# Patient Record
Sex: MALE | Race: AMERICAN INDIAN OR ALASKA NATIVE
[De-identification: names, ages, dates, MRNs, and addresses within clinical notes are randomized per-mention and may not be internally consistent; named-entity substitution may affect disease eponyms.]

---

## 2022-05-18 ENCOUNTER — HOSPITAL ENCOUNTER (EMERGENCY)
Dept: HOSPITAL 5 - ED | Age: 52
Discharge: HOME | End: 2022-05-18
Payer: SELF-PAY

## 2022-05-18 VITALS — DIASTOLIC BLOOD PRESSURE: 86 MMHG | SYSTOLIC BLOOD PRESSURE: 132 MMHG

## 2022-05-18 DIAGNOSIS — K05.00: ICD-10-CM

## 2022-05-18 DIAGNOSIS — K02.9: Primary | ICD-10-CM

## 2022-05-18 PROCEDURE — 96372 THER/PROPH/DIAG INJ SC/IM: CPT

## 2022-05-18 PROCEDURE — 99283 EMERGENCY DEPT VISIT LOW MDM: CPT

## 2022-05-18 NOTE — EMERGENCY DEPARTMENT REPORT
ED Headache HPI





- General


Chief Complaint: Headache


Stated Complaint: HEADACHE


Time Seen by Provider: 05/18/22 12:26





- History of Present Illness


Initial Comments: 





52 yo M who presents with left upper dental pain that started yesterday shortly 

after eating Mexican chili food.  This dental pain is radiating and causing him 

left-sided headache.  Eating or drinking worsening pain.  She has tried 

over-the-counter medication with no relief.  Patient denies any fever or chills.

 Patient has not seen any dentist recently.  No other modifying or positive 

factors reported.


Allergies/Adverse Reactions: 


Allergies





No Known Allergies Allergy (Verified 05/18/22 09:27)


   








Home Medications: 


Ambulatory Orders





Ketorolac [Toradol] 10 mg PO Q6H PRN 3 Days #12 tab NS 05/18/22 


Penicillin V Potassium 500 mg PO BID 10 Days #20 tab NS 05/18/22 











ED Review of Systems


ROS: 


Stated complaint: HEADACHE


Other details as noted in HPI





Comment: All other systems reviewed and negative


ENT: dental pain


Neurological: headache





ED Past Medical Hx





- Medications


Home Medications: 


                                Home Medications











 Medication  Instructions  Recorded  Confirmed  Last Taken  Type


 


Ketorolac [Toradol] 10 mg PO Q6H PRN 3 Days #12 tab NS 05/18/22  Unknown Rx


 


Penicillin V Potassium 500 mg PO BID 10 Days #20 tab NS 05/18/22  Unknown Rx














ED Physical Exam





- General


Limitations: No Limitations


General appearance: alert, in distress (Due to left upper dental pain)





- Head


Head exam: Present: normocephalic, normal inspection





- Eye


Eye exam: Present: normal appearance





- ENT


ENT exam: Present: normal exam, mucous membranes moist, other (Gingiva erythema 

with dental caries)





- Neck


Neck exam: Present: normal inspection, full ROM.  Absent: tenderness, 

meningismus, lymphadenopathy, thyromegaly





- Respiratory


Respiratory exam: Present: normal lung sounds bilaterally.  Absent: respiratory 

distress, accessory muscle use





- Cardiovascular


Cardiovascular Exam: Present: regular rate, normal rhythm, normal heart sounds





- GI/Abdominal


GI/Abdominal exam: Present: soft, normal bowel sounds.  Absent: distended, 

tenderness





- Extremities Exam


Extremities exam: Present: normal inspection





- Back Exam


Back exam: Absent: tenderness





- Neurological Exam


Neurological exam: Present: alert, oriented X3





- Psychiatric


Psychiatric exam: Present: normal affect





ED Course


                                   Vital Signs











  05/18/22





  09:26


 


Temperature 98 F


 


Pulse Rate 60


 


Blood Pressure 170/96





[Left] 


 


O2 Sat by Pulse 98





Oximetry 














- Reevaluation(s)


Reevaluation #1: 





05/18/22 13:04


Here with hopeful left tooth pain with tenderness in this area--noted with 

gingival erythema and dental caries--we will go ahead and give pain medicine for

 relief and DC home on antibiotics and pain medication with close follow-up with

 primary doctor/dentist.


Critical care attestation.: 


If time is entered above; I have spent that time in minutes in the direct care 

of this critically ill patient, excluding procedure time.








ED Disposition


Clinical Impression: 


 Dental caries, Gingivitis





Disposition: 01 HOME / SELF CARE / HOMELESS


Is pt being admited?: No


Does the pt Need Aspirin: No


Condition: Stable


Instructions:  Preventive Dental Care, Adult


Additional Instructions: 


It is important that you take and complete your antibiotics as prescribed





Increase your daily fluid to help your hydration





It is very important that you brush your teeth at least twice a day especially 

before going to bed at night





Call and schedule follow-up with your dentist/primary doctor in the next 3 to 5 

days for progress





Please do not hesitate to call or return to emergency room if your symptoms 

worsen


Prescriptions: 


Penicillin V Potassium 500 mg PO BID 10 Days #20 tab NS


Ketorolac [Toradol] 10 mg PO Q6H PRN 3 Days #12 tab NS


 PRN Reason: Pain


Referrals: 


GILLIAN NGUYỄN MD [Primary Care Provider] - 3-5 Days


Time of Disposition: 13:10